# Patient Record
(demographics unavailable — no encounter records)

---

## 2025-03-23 NOTE — DISCUSSION/SUMMARY
[FreeTextEntry1] : HARRISON  is a healthy, thriving 7 year old who presents without identified concerns for congestive heart failure nor impaired cardiac output by his  past medical history nor on his  current physical exam.   -His EKG demonstrated possible LVH. Normal biventricular size and systolic function on echocardiogram.  -There is trivial tricuspid, mild pulmonary and physiologic mitral regurgitation in otherwise well functioning valves. This was not audible on physical exam and not hemodynamically significant at this time. Will follow longitudinally specifically the degree of pulmonary regurgitation.   -Resting EKG with sinus rhythm and intermittent, slow ectopic atrial rhythm as evident by slight P-wave morphology changes. Asymptomatic during EKG but did state possible episode in the office during our discussion. Given palpitations consideration for EAT is warranted albeit less likely.   - I discussed at length with the family the causes of palpitations in children of this age group, which may represent an arrhythmia but also could simply represent an increased awareness of his  own heart beat (especially during periods of activity, dehydration, pain or anxiety, when a "stronger" heart beat may be noted). I recommended he  keep a journal of ongoing episodes and to contact our office should symptoms worsen or fail to improve. We discussed symptoms that should prompt medical attention immediately as well as reviewed symptoms of an arrhythmia.   -I recommended placement of a 7 day Holter monitor with journal. Family to contact to discuss results.  -Assure adequate hydration -Avoid caffeine intake.     I recommended 6 month follow up or sooner pending symptoms and Holter results and we reviewed signs and symptoms that should prompt medical attention and sooner evaluation. Above information explained in detail with assistance of a colored diagram.  HARRISON and family verbalized their understanding, agreed with the plan and all questions were answered.  [Needs SBE Prophylaxis] : [unfilled] does not need bacterial endocarditis prophylaxis [PE + No Restrictions] : [unfilled] may participate in the entire physical education program without restriction, including all varsity competitive sports.

## 2025-03-23 NOTE — REVIEW OF SYSTEMS
[Palpitations] : palpitations [Shortness Of Breath] : expressed as feeling short of breath [Feeling Poorly] : not feeling poorly (malaise) [Fever] : no fever [Wgt Loss (___ Lbs)] : no recent weight loss [Pallor] : not pale [Eye Discharge] : no eye discharge [Redness] : no redness [Change in Vision] : no change in vision [Nasal Stuffiness] : no nasal congestion [Sore Throat] : no sore throat [Earache] : no earache [Loss Of Hearing] : no hearing loss [Cyanosis] : no cyanosis [Edema] : no edema [Diaphoresis] : not diaphoretic [Chest Pain] : no chest pain or discomfort [Exercise Intolerance] : no persistence of exercise intolerance [Orthopnea] : no orthopnea [Fast HR] : no tachycardia [Nosebleeds] : no epistaxis [Tachypnea] : not tachypneic [Wheezing] : no wheezing [Cough] : no cough [Being A Poor Eater] : not a poor eater [Vomiting] : no vomiting [Diarrhea] : no diarrhea [Decrease In Appetite] : appetite not decreased [Abdominal Pain] : no abdominal pain [Fainting (Syncope)] : no fainting [Seizure] : no seizures [Headache] : no headache [Dizziness] : no dizziness [Limping] : no limping [Joint Pains] : no arthralgias [Joint Swelling] : no joint swelling [Rash] : no rash [Wound problems] : no wound problems [Skin Peeling] : no skin peeling [Easy Bruising] : no tendency for easy bruising [Swollen Glands] : no lymphadenopathy [Easy Bleeding] : no ~M tendency for easy bleeding [Sleep Disturbances] : ~T no sleep disturbances [Hyperactive] : no hyperactive behavior [Failure To Thrive] : no failure to thrive [Short Stature] : short stature was not noted [Jitteriness] : no jitteriness [Heat/Cold Intolerance] : no temperature intolerance [Dec Urine Output] : no oliguria

## 2025-03-23 NOTE — HISTORY OF PRESENT ILLNESS
[FreeTextEntry1] : Alex is a well appearing, active 7 year old who was referred for evaluation of his recent episodes of palpitations. He is accompanied by his parent that provided additional information.   Pattie reports since February intermittent episodes of palpitations. Episodes described as "sporadic," and felt as a faster than usual heart rate. He reports the sensation of possible impaired inhalation during the episode. Witness by his parent who states; well appearing no overt respiratory distress. Alex consistently will cover his mouth and nose with his hand during the episode and breath through his nose which per Alex and his parent resolve sensation.   The episode is not clearly uncomfortable. He notices the heart rate which may seem faster. Lasts less than a minute; no additional associated symptoms. Episodes may occur up to once per week and limited to at rest. Parent suspect a behavioral component.    There has been no unexplained crying or irritability to suggest chest pain or palpitations.  There has been no chest pain, dizziness, lightheadedness, syncope or near syncope.  Active in multiple sports without concerns. No chest pain, palpitations or syncope with exercise.  There has been no history of exercise induced symptoms nor recent changes in exercise tolerance or activity levels. Good energy levels.  Denies fasting. Denies caffeine intake. Could improve daily hydration.  No reported concerns with growth or development.  There has been no recent fevers, illnesses or hospitalizations.    No family history of an arrhythmia, aortic aneurysm, unexplained death, bicuspid aortic valve,  congenital heart disease, cardiomyopathy or sudden cardiac death, long QT syndrome, drowning or unexplained accidental death.

## 2025-03-23 NOTE — CARDIOLOGY SUMMARY
[LVSF ___%] : LV Shortening Fraction [unfilled]% [de-identified] : 3/18/25 [FreeTextEntry1] : Normal sinus rhythm @ ~413 bpm Intermittent ectopic atrial rhythm ME: 104 ms QRS: 84 ms  QTc: ~413  ms P-R-T Axis (21-89-65) Possible LVH No ST segment abnormalities No pre-excitation  [de-identified] : 3/18/25 [FreeTextEntry2] :  A complete 2D, M-mode, doppler and color flow doppler transthoracic pediatric echocardiogram was performed. The intracardiac anatomy and doppler flow profiles were otherwise normal appearing with the following:   Summary: 1. Trivial tricuspid valve regurgitation. 2. Mild pulmonary valve regurgitation. 3. Physiologic mitral valve regurgitation. 4. Normal right ventricular morphology with qualitatively normal size and systolic function. 5. Normal left ventricular size, morphology and systolic function. 6. No pericardial effusion.

## 2025-03-23 NOTE — CARDIOLOGY SUMMARY
[LVSF ___%] : LV Shortening Fraction [unfilled]% [de-identified] : 3/18/25 [FreeTextEntry1] : Normal sinus rhythm @ ~413 bpm Intermittent ectopic atrial rhythm WI: 104 ms QRS: 84 ms  QTc: ~413  ms P-R-T Axis (21-89-65) Possible LVH No ST segment abnormalities No pre-excitation  [de-identified] : 3/18/25 [FreeTextEntry2] :  A complete 2D, M-mode, doppler and color flow doppler transthoracic pediatric echocardiogram was performed. The intracardiac anatomy and doppler flow profiles were otherwise normal appearing with the following:   Summary: 1. Trivial tricuspid valve regurgitation. 2. Mild pulmonary valve regurgitation. 3. Physiologic mitral valve regurgitation. 4. Normal right ventricular morphology with qualitatively normal size and systolic function. 5. Normal left ventricular size, morphology and systolic function. 6. No pericardial effusion.

## 2025-03-23 NOTE — CONSULT LETTER
[Today's Date] : [unfilled] [Name] : Name: [unfilled] [] : : ~~ [Today's Date:] : [unfilled] [Dear  ___:] : Dear Dr. [unfilled]: [Consult] : I had the pleasure of evaluating your patient, [unfilled]. My full evaluation follows. [Consult - Single Provider] : Thank you very much for allowing me to participate in the care of this patient. If you have any questions, please do not hesitate to contact me. [Sincerely,] : Sincerely, [FreeTextEntry4] : Sadiq Kimbrough MD [FreeTextEntry5] : [address]  [FreeTextEntry6] : [place], [id number] [de-identified] : Eliecer Steinberg DO MPH Pediatric Cardiology [facility]

## 2025-03-23 NOTE — PHYSICAL EXAM
[General Appearance - Alert] : alert [General Appearance - In No Acute Distress] : in no acute distress [General Appearance - Well Nourished] : well nourished [General Appearance - Well Developed] : well developed [General Appearance - Well-Appearing] : well appearing [Appearance Of Head] : the head was normocephalic [Facies] : there were no dysmorphic facial features [Sclera] : the conjunctiva were normal [Outer Ear] : the ears and nose were normal in appearance [Examination Of The Oral Cavity] : mucous membranes were moist and pink [Auscultation Breath Sounds / Voice Sounds] : breath sounds clear to auscultation bilaterally [Normal Chest Appearance] : the chest was normal in appearance [Apical Impulse] : quiet precordium with normal apical impulse [Heart Rate And Rhythm] : normal heart rate and rhythm [Heart Sounds] : normal S1 and S2 [No Murmur] : no murmurs  [Heart Sounds Gallop] : no gallops [Heart Sounds Pericardial Friction Rub] : no pericardial rub [Edema] : no edema [Heart Sounds Click] : no clicks [Arterial Pulses] : normal upper and lower extremity pulses with no pulse delay [Capillary Refill Test] : normal capillary refill [Bowel Sounds] : normal bowel sounds [Abdomen Soft] : soft [Nondistended] : nondistended [Abdomen Tenderness] : non-tender [Nail Clubbing] : no clubbing  or cyanosis of the fingers [Motor Tone] : normal muscle strength and tone [Cervical Lymph Nodes Enlarged Anterior] : The anterior cervical nodes were normal [Cervical Lymph Nodes Enlarged Posterior] : The posterior cervical nodes were normal [] : no rash [Skin Lesions] : no lesions [Skin Turgor] : normal turgor [Demonstrated Behavior - Infant Nonreactive To Parents] : interactive [Mood] : mood and affect were appropriate for age [Demonstrated Behavior] : normal behavior [FreeTextEntry7] : Femoral Pulse +2 B/L; Posterior tibial pulse +2 B/L

## 2025-03-23 NOTE — CONSULT LETTER
[Today's Date] : [unfilled] [Name] : Name: [unfilled] [] : : ~~ [Today's Date:] : [unfilled] [Dear  ___:] : Dear Dr. [unfilled]: [Consult] : I had the pleasure of evaluating your patient, [unfilled]. My full evaluation follows. [Consult - Single Provider] : Thank you very much for allowing me to participate in the care of this patient. If you have any questions, please do not hesitate to contact me. [Sincerely,] : Sincerely, [FreeTextEntry4] : Sadiq Kimbrough MD [FreeTextEntry5] : [address]  [FreeTextEntry6] : [place], [id number] [de-identified] : Eliecer Steinberg DO MPH Pediatric Cardiology [facility]